# Patient Record
Sex: MALE | Race: WHITE | HISPANIC OR LATINO | Employment: FULL TIME | URBAN - METROPOLITAN AREA
[De-identification: names, ages, dates, MRNs, and addresses within clinical notes are randomized per-mention and may not be internally consistent; named-entity substitution may affect disease eponyms.]

---

## 2022-09-23 ENCOUNTER — OFFICE VISIT (OUTPATIENT)
Dept: ENDOCRINOLOGY | Facility: CLINIC | Age: 27
End: 2022-09-23

## 2022-09-23 ENCOUNTER — LAB (OUTPATIENT)
Dept: LAB | Facility: HOSPITAL | Age: 27
End: 2022-09-23

## 2022-09-23 VITALS
BODY MASS INDEX: 30.48 KG/M2 | OXYGEN SATURATION: 97 % | HEIGHT: 72 IN | SYSTOLIC BLOOD PRESSURE: 118 MMHG | HEART RATE: 107 BPM | WEIGHT: 225 LBS | DIASTOLIC BLOOD PRESSURE: 74 MMHG

## 2022-09-23 DIAGNOSIS — E34.8 HYPERESTROGENISM IN MALE: Primary | ICD-10-CM

## 2022-09-23 DIAGNOSIS — R79.89 LOW TESTOSTERONE IN MALE: ICD-10-CM

## 2022-09-23 PROCEDURE — 82679 ASSAY OF ESTRONE: CPT | Performed by: INTERNAL MEDICINE

## 2022-09-23 PROCEDURE — 84402 ASSAY OF FREE TESTOSTERONE: CPT | Performed by: INTERNAL MEDICINE

## 2022-09-23 PROCEDURE — 99204 OFFICE O/P NEW MOD 45 MIN: CPT | Performed by: INTERNAL MEDICINE

## 2022-09-23 PROCEDURE — 83002 ASSAY OF GONADOTROPIN (LH): CPT | Performed by: INTERNAL MEDICINE

## 2022-09-23 PROCEDURE — 82670 ASSAY OF TOTAL ESTRADIOL: CPT | Performed by: INTERNAL MEDICINE

## 2022-09-23 PROCEDURE — 84403 ASSAY OF TOTAL TESTOSTERONE: CPT | Performed by: INTERNAL MEDICINE

## 2022-09-23 PROCEDURE — 84702 CHORIONIC GONADOTROPIN TEST: CPT | Performed by: INTERNAL MEDICINE

## 2022-09-23 PROCEDURE — 36415 COLL VENOUS BLD VENIPUNCTURE: CPT | Performed by: INTERNAL MEDICINE

## 2022-09-23 PROCEDURE — 83001 ASSAY OF GONADOTROPIN (FSH): CPT | Performed by: INTERNAL MEDICINE

## 2022-09-23 NOTE — ASSESSMENT & PLAN NOTE
Very high total estrogen level. In the setting where patient is clinically feminized makes me very suspicious for a feminizing tumor. With a normal testicular exam, it's not likely to be testicular but more likely to be adrenal. I suppose a small testicular tumor could be missed by exam and could only be found by ultrasound .  Will get labs as listed. Then will likely need adrenal CT or testes ultrasound

## 2022-09-23 NOTE — PROGRESS NOTES
"     Office Note      Date: 2022  Patient Name: Homer Tillman  MRN: [id number]  : 1995    Chief Complaint   Patient presents with   • Hypogonadism       History of Present Illness:   Homer Tillman is a 27 y.o. male who presents for Hypogonadism  He is seen as a new patient today  -------------------------------------------  He reports that for the last 4 years he has felt progressively more feminine. He is \"growing boobs\" and his hips are wider. He states that he finds himself \" attracted to dudes\" whereas he did not used to be. He has breast swelling and pain.  Notes no changes to his testes.  Labs were reviewed (see overview)    Subjective          Review of Systems:   Review of Systems   Psychiatric/Behavioral: Positive for dysphoric mood. The patient is nervous/anxious.    All other systems reviewed and are negative.      The following portions of the patient's history were reviewed and updated as appropriate: allergies, current medications, past family history, past medical history, past social history, past surgical history and problem list.    Objective     Visit Vitals  /74 (BP Location: Left arm, Patient Position: Sitting, Cuff Size: Adult)   Pulse 107   Ht 182.9 cm (72\")   Wt 102 kg (225 lb)   SpO2 97%   BMI 30.52 kg/m²       Labs:    CBC w/DIFF  No results found for: WBC, RBC, HGB, HCT, MCV, MCH, MCHC, RDW, RDWSD, MPV, PLT, NEUTRORELPCT, LYMPHORELPCT, MONORELPCT, EOSRELPCT, BASORELPCT, AUTOIGPER, NEUTROABS, LYMPHSABS, MONOSABS, EOSABS, BASOSABS, AUTOIGNUM, NRBC    T4  No results found for: FREET4    TSH  No results found for: TSHBASE     Physical Exam:  Physical Exam  Vitals reviewed.   Constitutional:       General: He is not in acute distress.     Appearance: Normal appearance. He is not ill-appearing, toxic-appearing or diaphoretic.   HENT:      Head: Normocephalic and atraumatic.   Cardiovascular:      Rate and Rhythm: Normal rate.   Pulmonary:      Effort: Pulmonary effort is normal. "   Chest:      Comments: Bilateral gynecomastia  Abdominal:      General: There is no distension.      Palpations: There is no mass.   Genitourinary:     Testes: Normal.   Neurological:      Mental Status: He is alert.         Assessment / Plan      Assessment & Plan:  Problem List Items Addressed This Visit        Other    Low testosterone in male    Overview     Total testo was 260 but free T was normal  Total estrogen level was very high over 700  Testicular exam normal         Current Assessment & Plan     Free T was normal so he is NOT hypogonadal.            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 In the setting where patient is clinically feminized makes me very suspicious for a feminizing tumor. With a normal testicular exam, it's not likely to be testicular but more likely to be adrenal. I suppose a small testicular tumor could be missed by exam and could only be found by ultrasound .  Will get labs as listed. Then will likely need adrenal CT or testes ultrasound          Relevant Orders    Testosterone Free MS / Dialysis    FSH & LH    Estradiol    HCG, B-subunit, Quantitative    Estrogens, Fractionated           Edgar Hope MD   09/23/2022

## 2022-09-24 LAB
ESTRADIOL SERPL HS-MCNC: 10.2 PG/ML
FSH SERPL-ACNC: 1.69 MIU/ML
HCG INTACT+B SERPL-ACNC: <1 MIU/ML (ref 0–3)
LH SERPL-ACNC: 3.44 MIU/ML

## 2022-09-28 LAB
ESTRADIOL SERPL-MCNC: 22.3 PG/ML (ref 7.6–42.6)
ESTRONE SERPL-MCNC: 21 PG/ML (ref 0–174)

## 2022-09-30 LAB
TESTOST FREE MFR SERPL: 1.9 %
TESTOST FREE SERPL-MCNC: 67 PG/ML
TESTOST SERPL-MCNC: 350 NG/DL

## 2022-11-07 ENCOUNTER — OFFICE VISIT (OUTPATIENT)
Dept: ENDOCRINOLOGY | Facility: CLINIC | Age: 27
End: 2022-11-07

## 2022-11-07 ENCOUNTER — LAB (OUTPATIENT)
Dept: LAB | Facility: HOSPITAL | Age: 27
End: 2022-11-07

## 2022-11-07 VITALS
HEIGHT: 72 IN | WEIGHT: 229 LBS | DIASTOLIC BLOOD PRESSURE: 78 MMHG | SYSTOLIC BLOOD PRESSURE: 118 MMHG | HEART RATE: 84 BPM | BODY MASS INDEX: 31.02 KG/M2

## 2022-11-07 DIAGNOSIS — E34.8 HYPERESTROGENISM IN MALE: ICD-10-CM

## 2022-11-07 DIAGNOSIS — R10.32 LEFT LOWER QUADRANT ABDOMINAL PAIN: ICD-10-CM

## 2022-11-07 DIAGNOSIS — E34.8 HYPERESTROGENISM IN MALE: Primary | ICD-10-CM

## 2022-11-07 LAB
FSH SERPL-ACNC: 1.71 MIU/ML
LH SERPL-ACNC: 3.08 MIU/ML

## 2022-11-07 PROCEDURE — 36415 COLL VENOUS BLD VENIPUNCTURE: CPT

## 2022-11-07 PROCEDURE — 83002 ASSAY OF GONADOTROPIN (LH): CPT

## 2022-11-07 PROCEDURE — 82679 ASSAY OF ESTRONE: CPT

## 2022-11-07 PROCEDURE — 83001 ASSAY OF GONADOTROPIN (FSH): CPT

## 2022-11-07 PROCEDURE — 82670 ASSAY OF TOTAL ESTRADIOL: CPT

## 2022-11-07 PROCEDURE — 99214 OFFICE O/P EST MOD 30 MIN: CPT | Performed by: INTERNAL MEDICINE

## 2022-11-07 NOTE — ASSESSMENT & PLAN NOTE
It's possible that since one level  Was high and the repeat was normal that this could be cyclical in nature. Will recheck.   Will get ct of abdomen

## 2022-11-07 NOTE — PROGRESS NOTES
"     Office Note      Date: 2022  Patient Name: Homer Tillman  MRN: [id number]  : 1995    Chief Complaint   Patient presents with   • hyperestrogenism       History of Present Illness:   Homer Tillman is a 27 y.o. male who presents for hyperestrogenism  initially, his total estrogen level was >700. But when I repeated it the level was normal. His repeat testosterone was normal at well.  His complaints had been those of feminization.  ====================  3 times now he has had episodes where he feels like he has pms.  He gets lower abdominal and pelvic cramping. Associated with nausea. This lasts about a week. During this time he has mood swings and notices and increased libido.  He has had hyperosmia at this time as well.  -------------------------------------  When he ate pie the cramping went away.      Subjective          Review of Systems:   Review of Systems   Gastrointestinal: Positive for abdominal pain and nausea.   Psychiatric/Behavioral: Positive for agitation and sleep disturbance.       The following portions of the patient's history were reviewed and updated as appropriate: allergies, current medications, past family history, past medical history, past social history, past surgical history and problem list.    Objective     Visit Vitals  /78   Pulse 84   Ht 182.9 cm (72\")   Wt 104 kg (229 lb)   BMI 31.06 kg/m²       Labs:    CBC w/DIFF  No results found for: WBC, RBC, HGB, HCT, MCV, MCH, MCHC, RDW, RDWSD, MPV, PLT, NEUTRORELPCT, LYMPHORELPCT, MONORELPCT, EOSRELPCT, BASORELPCT, AUTOIGPER, NEUTROABS, LYMPHSABS, MONOSABS, EOSABS, BASOSABS, AUTOIGNUM, NRBC    T4  No results found for: FREET4    TSH  No results found for: TSHBASE     Physical Exam:  Physical Exam  Vitals reviewed.   Constitutional:       Appearance: Normal appearance.   HENT:      Head: Normocephalic.      Right Ear: Tympanic membrane normal.      Left Ear: Tympanic membrane normal.   Cardiovascular:      Rate and Rhythm: " Normal rate and regular rhythm.   Abdominal:      General: Bowel sounds are normal.   Neurological:      Mental Status: He is alert.   Psychiatric:         Mood and Affect: Mood normal.         Behavior: Behavior normal.         Thought Content: Thought content normal.         Judgment: Judgment normal.         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 Will recheck.   Will get ct of abdomen         Relevant Orders    FSH & LH    Estrogens, Fractionated    CT Abdomen With & Without Contrast    Left lower quadrant abdominal pain    Current Assessment & Plan     Will get CT         Relevant Orders    CT Abdomen With & Without Contrast        Edgar Hope MD   11/07/2022

## 2022-11-11 LAB
ESTRADIOL SERPL-MCNC: 28.2 PG/ML (ref 7.6–42.6)
ESTRONE SERPL-MCNC: 50 PG/ML (ref 0–174)

## 2022-12-07 ENCOUNTER — APPOINTMENT (OUTPATIENT)
Dept: CT IMAGING | Facility: HOSPITAL | Age: 27
End: 2022-12-07

## 2022-12-09 ENCOUNTER — LAB (OUTPATIENT)
Dept: LAB | Facility: HOSPITAL | Age: 27
End: 2022-12-09

## 2022-12-09 ENCOUNTER — OFFICE VISIT (OUTPATIENT)
Dept: ENDOCRINOLOGY | Facility: CLINIC | Age: 27
End: 2022-12-09

## 2022-12-09 VITALS
HEIGHT: 72 IN | SYSTOLIC BLOOD PRESSURE: 118 MMHG | BODY MASS INDEX: 31.23 KG/M2 | WEIGHT: 230.6 LBS | HEART RATE: 74 BPM | DIASTOLIC BLOOD PRESSURE: 84 MMHG | OXYGEN SATURATION: 98 %

## 2022-12-09 DIAGNOSIS — R79.89 LOW TESTOSTERONE IN MALE: ICD-10-CM

## 2022-12-09 DIAGNOSIS — E34.8 HYPERESTROGENISM IN MALE: Primary | ICD-10-CM

## 2022-12-09 DIAGNOSIS — E34.8 HYPERESTROGENISM IN MALE: ICD-10-CM

## 2022-12-09 LAB
ALPHA-FETOPROTEIN: 3.57 NG/ML (ref 0–8.3)
CEA SERPL-MCNC: 2.4 NG/ML
CORTIS SERPL-MCNC: 14.85 MCG/DL
TESTOST SERPL-MCNC: 336 NG/DL (ref 249–836)

## 2022-12-09 PROCEDURE — 36415 COLL VENOUS BLD VENIPUNCTURE: CPT

## 2022-12-09 PROCEDURE — 82105 ALPHA-FETOPROTEIN SERUM: CPT

## 2022-12-09 PROCEDURE — 99213 OFFICE O/P EST LOW 20 MIN: CPT | Performed by: INTERNAL MEDICINE

## 2022-12-09 PROCEDURE — 82627 DEHYDROEPIANDROSTERONE: CPT

## 2022-12-09 PROCEDURE — 82533 TOTAL CORTISOL: CPT

## 2022-12-09 PROCEDURE — 82378 CARCINOEMBRYONIC ANTIGEN: CPT

## 2022-12-09 PROCEDURE — 82679 ASSAY OF ESTRONE: CPT

## 2022-12-09 PROCEDURE — 82670 ASSAY OF TOTAL ESTRADIOL: CPT

## 2022-12-09 PROCEDURE — 83498 ASY HYDROXYPROGESTERONE 17-D: CPT

## 2022-12-09 PROCEDURE — 84403 ASSAY OF TOTAL TESTOSTERONE: CPT

## 2022-12-09 NOTE — ASSESSMENT & PLAN NOTE
Will do further testing.  I am not convinced there is an underlying hormonal disorder.  He is concerned that he might have cancer.  I re-assured him that I seen nothing pointing towards that

## 2022-12-09 NOTE — PROGRESS NOTES
"     Office Note      Date: 2022  Patient Name: Homer Tillman  MRN: [id number]  : 1995    Chief Complaint   Patient presents with   • Hyperestrogenism in male       History of Present Illness:   Homer Tillman is a 27 y.o. male who presents for Hyperestrogenism in male  .   At some  Points he feels like he has headaches and mood swings and he feels like he has a vigorous libido but these are not at the same time.  He feels like his body had changed. He feels like it has become more feminine.  He notes GI symptoms - diarrhea and constipation.  He feels he is interested in both women and men  He feels like he goes through cycles. - fatigue/ headach/ cramps.        Subjective          Review of Systems:   Review of Systems   Constitutional:        See ros       The following portions of the patient's history were reviewed and updated as appropriate: allergies, current medications, past family history, past medical history, past social history, past surgical history and problem list.    Objective     Visit Vitals  /84   Pulse 74   Ht 182.9 cm (72\")   Wt 105 kg (230 lb 9.6 oz)   SpO2 98%   BMI 31.27 kg/m²           Physical Exam:  Physical Exam  Vitals reviewed.   Constitutional:       Appearance: Normal appearance.   Neurological:      Mental Status: He is alert.   Psychiatric:         Mood and Affect: Mood normal.         Behavior: Behavior normal.         Thought Content: Thought content normal.         Judgment: Judgment normal.         Assessment / Plan      Assessment & Plan:  Problem List Items Addressed This Visit        Other    Low testosterone in male    Overview     Total testo was 260 but free T was normal  Total estrogen level was very high over 700  Testicular exam normal    Repeat estrogens normal times 2         Current Assessment & Plan     Will do further testing.  I am not convinced there is an underlying hormonal disorder.  He is concerned that he might have cancer.  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

## 2022-12-10 LAB — DHEA-S SERPL-MCNC: 165 UG/DL (ref 138.5–475.2)

## 2022-12-13 LAB
ESTRADIOL SERPL-MCNC: 32.3 PG/ML (ref 7.6–42.6)
ESTRONE SERPL-MCNC: 32 PG/ML (ref 0–174)

## 2022-12-15 LAB — 17OHP SERPL-MCNC: 44 NG/DL (ref 27–199)

## 2023-01-23 DIAGNOSIS — R10.32 LEFT LOWER QUADRANT ABDOMINAL PAIN: Primary | ICD-10-CM

## 2023-02-13 DIAGNOSIS — R10.32 LEFT LOWER QUADRANT ABDOMINAL PAIN: Primary | ICD-10-CM

## 2023-02-16 DIAGNOSIS — R10.32 LEFT LOWER QUADRANT ABDOMINAL PAIN: Primary | ICD-10-CM

## 2023-02-28 ENCOUNTER — HOSPITAL ENCOUNTER (OUTPATIENT)
Dept: ULTRASOUND IMAGING | Facility: HOSPITAL | Age: 28
Discharge: HOME | End: 2023-02-28
Admitting: INTERNAL MEDICINE
Payer: COMMERCIAL

## 2023-02-28 DIAGNOSIS — R10.32 LEFT LOWER QUADRANT ABDOMINAL PAIN: ICD-10-CM

## 2023-02-28 PROCEDURE — 76857 US EXAM PELVIC LIMITED: CPT

## 2023-04-07 ENCOUNTER — TELEPHONE (OUTPATIENT)
Dept: ENDOCRINOLOGY | Facility: CLINIC | Age: 28
End: 2023-04-07
Payer: COMMERCIAL

## 2023-04-07 NOTE — TELEPHONE ENCOUNTER
[facility] WITH [employer] CALLED TO SEE IF PATIENT WILL BE SEEN BY DR INFANTE AGAIN. PATIENT DID SEND MYCHART MESSAGE TO DR INFANTE RECENTLY REGARDING OBTAINING AN ORDER FOR A CT SCAN.    PLEASE RETURN CALL TO [facility] [phone]

## 2023-04-18 ENCOUNTER — TRANSCRIBE ORDERS (OUTPATIENT)
Dept: ADMINISTRATIVE | Facility: HOSPITAL | Age: 28
End: 2023-04-18
Payer: COMMERCIAL

## 2023-04-18 DIAGNOSIS — R10.9 ABDOMINAL PAIN, UNSPECIFIED ABDOMINAL LOCATION: Primary | ICD-10-CM
